# Patient Record
Sex: MALE | Race: WHITE | Employment: OTHER | URBAN - METROPOLITAN AREA
[De-identification: names, ages, dates, MRNs, and addresses within clinical notes are randomized per-mention and may not be internally consistent; named-entity substitution may affect disease eponyms.]

---

## 2017-02-18 ENCOUNTER — LAB ENCOUNTER (OUTPATIENT)
Dept: LAB | Facility: HOSPITAL | Age: 42
End: 2017-02-18
Attending: SURGERY
Payer: MEDICAID

## 2017-02-18 DIAGNOSIS — R35.1 NOCTURIA: ICD-10-CM

## 2017-02-18 DIAGNOSIS — Z13.220 ENCOUNTER FOR SCREENING FOR LIPID DISORDER: ICD-10-CM

## 2017-02-18 DIAGNOSIS — Z00.00 LABORATORY EXAMINATION ORDERED AS PART OF A COMPLETE PHYSICAL EXAMINATION: ICD-10-CM

## 2017-02-18 DIAGNOSIS — K82.8 GALLBLADDER SLUDGE: ICD-10-CM

## 2017-02-18 DIAGNOSIS — Z13.29 ENCOUNTER FOR SCREENING FOR ENDOCRINE DISORDER: ICD-10-CM

## 2017-02-18 DIAGNOSIS — K80.10 CHRONIC CHOLECYSTITIS WITH CALCULUS: ICD-10-CM

## 2017-02-18 DIAGNOSIS — K21.9 GASTROESOPHAGEAL REFLUX DISEASE, ESOPHAGITIS PRESENCE NOT SPECIFIED: ICD-10-CM

## 2017-02-18 DIAGNOSIS — Z13.228 ENCOUNTER FOR SCREENING FOR METABOLIC DISORDER: ICD-10-CM

## 2017-02-18 DIAGNOSIS — Z13.0 SCREENING FOR DISORDER OF BLOOD AND BLOOD-FORMING ORGANS: ICD-10-CM

## 2017-02-18 LAB
ALBUMIN SERPL BCP-MCNC: 4.2 G/DL (ref 3.5–4.8)
ALBUMIN/GLOB SERPL: 1.3 {RATIO} (ref 1–2)
ALP SERPL-CCNC: 46 U/L (ref 32–100)
ALT SERPL-CCNC: 47 U/L (ref 17–63)
ANION GAP SERPL CALC-SCNC: 6 MMOL/L (ref 0–18)
APTT PPP: 27.4 SECONDS (ref 23.2–35.3)
AST SERPL-CCNC: 30 U/L (ref 15–41)
BACTERIA UR QL AUTO: NEGATIVE /HPF
BASOPHILS # BLD: 0 K/UL (ref 0–0.2)
BASOPHILS NFR BLD: 0 %
BILIRUB SERPL-MCNC: 0.9 MG/DL (ref 0.3–1.2)
BILIRUB UR QL: NEGATIVE
BUN SERPL-MCNC: 13 MG/DL (ref 8–20)
BUN/CREAT SERPL: 12.9 (ref 10–20)
CALCIUM SERPL-MCNC: 9.4 MG/DL (ref 8.5–10.5)
CHLORIDE SERPL-SCNC: 106 MMOL/L (ref 95–110)
CHOLEST SERPL-MCNC: 209 MG/DL (ref 110–200)
CLARITY UR: CLEAR
CO2 SERPL-SCNC: 28 MMOL/L (ref 22–32)
COLOR UR: YELLOW
CREAT SERPL-MCNC: 1.01 MG/DL (ref 0.5–1.5)
EOSINOPHIL # BLD: 0.2 K/UL (ref 0–0.7)
EOSINOPHIL NFR BLD: 2 %
ERYTHROCYTE [DISTWIDTH] IN BLOOD BY AUTOMATED COUNT: 14.4 % (ref 11–15)
GLOBULIN PLAS-MCNC: 3.3 G/DL (ref 2.5–3.7)
GLUCOSE SERPL-MCNC: 94 MG/DL (ref 70–99)
GLUCOSE UR-MCNC: NEGATIVE MG/DL
HCT VFR BLD AUTO: 46.1 % (ref 41–52)
HDLC SERPL-MCNC: 35 MG/DL
HGB BLD-MCNC: 15.2 G/DL (ref 13.5–17.5)
INR BLD: 1.1 (ref 0.9–1.2)
KETONES UR-MCNC: NEGATIVE MG/DL
LDLC SERPL CALC-MCNC: 148 MG/DL (ref 0–99)
LEUKOCYTE ESTERASE UR QL STRIP.AUTO: NEGATIVE
LYMPHOCYTES # BLD: 1.4 K/UL (ref 1–4)
LYMPHOCYTES NFR BLD: 15 %
MCH RBC QN AUTO: 30.6 PG (ref 27–32)
MCHC RBC AUTO-ENTMCNC: 33 G/DL (ref 32–37)
MCV RBC AUTO: 92.8 FL (ref 80–100)
MONOCYTES # BLD: 0.7 K/UL (ref 0–1)
MONOCYTES NFR BLD: 7 %
NEUTROPHILS # BLD AUTO: 7.2 K/UL (ref 1.8–7.7)
NEUTROPHILS NFR BLD: 75 %
NITRITE UR QL STRIP.AUTO: NEGATIVE
NONHDLC SERPL-MCNC: 174 MG/DL
OSMOLALITY UR CALC.SUM OF ELEC: 290 MOSM/KG (ref 275–295)
PH UR: 5 [PH] (ref 5–8)
PLATELET # BLD AUTO: 237 K/UL (ref 140–400)
PMV BLD AUTO: 8.5 FL (ref 7.4–10.3)
POTASSIUM SERPL-SCNC: 4.5 MMOL/L (ref 3.3–5.1)
PROT SERPL-MCNC: 7.5 G/DL (ref 5.9–8.4)
PROT UR-MCNC: NEGATIVE MG/DL
PROTHROMBIN TIME: 13.8 SECONDS (ref 11.8–14.5)
PSA SERPL-MCNC: 0.3 NG/ML (ref 0–4)
RBC # BLD AUTO: 4.97 M/UL (ref 4.5–5.9)
RBC #/AREA URNS AUTO: 3 /HPF
SODIUM SERPL-SCNC: 140 MMOL/L (ref 136–144)
SP GR UR STRIP: 1.02 (ref 1–1.03)
TRIGL SERPL-MCNC: 128 MG/DL (ref 1–149)
TSH SERPL-ACNC: 0.89 UIU/ML (ref 0.34–5.6)
UROBILINOGEN UR STRIP-ACNC: <2
VIT C UR-MCNC: NEGATIVE MG/DL
WBC # BLD AUTO: 9.6 K/UL (ref 4–11)
WBC #/AREA URNS AUTO: <1 /HPF

## 2017-02-18 PROCEDURE — 84153 ASSAY OF PSA TOTAL: CPT

## 2017-02-18 PROCEDURE — 80061 LIPID PANEL: CPT

## 2017-02-18 PROCEDURE — 84443 ASSAY THYROID STIM HORMONE: CPT

## 2017-02-18 PROCEDURE — 80053 COMPREHEN METABOLIC PANEL: CPT

## 2017-02-18 PROCEDURE — 36415 COLL VENOUS BLD VENIPUNCTURE: CPT

## 2017-02-18 PROCEDURE — 81001 URINALYSIS AUTO W/SCOPE: CPT

## 2017-02-18 PROCEDURE — 85610 PROTHROMBIN TIME: CPT

## 2017-02-18 PROCEDURE — 85730 THROMBOPLASTIN TIME PARTIAL: CPT

## 2017-02-18 PROCEDURE — 85025 COMPLETE CBC W/AUTO DIFF WBC: CPT

## 2017-02-22 ENCOUNTER — SURGERY (OUTPATIENT)
Age: 42
End: 2017-02-22

## 2017-02-22 ENCOUNTER — HOSPITAL ENCOUNTER (OUTPATIENT)
Facility: HOSPITAL | Age: 42
Setting detail: HOSPITAL OUTPATIENT SURGERY
Discharge: HOME | End: 2017-02-22
Attending: SURGERY | Admitting: SURGERY
Payer: MEDICAID

## 2017-02-22 ENCOUNTER — ANESTHESIA (OUTPATIENT)
Dept: SURGERY | Facility: HOSPITAL | Age: 42
End: 2017-02-22
Payer: MEDICAID

## 2017-02-22 ENCOUNTER — APPOINTMENT (OUTPATIENT)
Dept: GENERAL RADIOLOGY | Facility: HOSPITAL | Age: 42
End: 2017-02-22
Attending: SURGERY
Payer: MEDICAID

## 2017-02-22 ENCOUNTER — ANESTHESIA EVENT (OUTPATIENT)
Dept: SURGERY | Facility: HOSPITAL | Age: 42
End: 2017-02-22
Payer: MEDICAID

## 2017-02-22 VITALS
BODY MASS INDEX: 26.06 KG/M2 | HEART RATE: 75 BPM | HEIGHT: 67 IN | DIASTOLIC BLOOD PRESSURE: 79 MMHG | RESPIRATION RATE: 16 BRPM | OXYGEN SATURATION: 100 % | SYSTOLIC BLOOD PRESSURE: 133 MMHG | TEMPERATURE: 98 F | WEIGHT: 166 LBS

## 2017-02-22 DIAGNOSIS — K80.10 CHRONIC CHOLECYSTITIS WITH CALCULUS: Primary | ICD-10-CM

## 2017-02-22 PROCEDURE — 0FT44ZZ RESECTION OF GALLBLADDER, PERCUTANEOUS ENDOSCOPIC APPROACH: ICD-10-PCS | Performed by: SURGERY

## 2017-02-22 PROCEDURE — 74300 X-RAY BILE DUCTS/PANCREAS: CPT

## 2017-02-22 PROCEDURE — BF101ZZ FLUOROSCOPY OF BILE DUCTS USING LOW OSMOLAR CONTRAST: ICD-10-PCS | Performed by: SURGERY

## 2017-02-22 PROCEDURE — 88304 TISSUE EXAM BY PATHOLOGIST: CPT | Performed by: SURGERY

## 2017-02-22 PROCEDURE — 8E0W4CZ ROBOTIC ASSISTED PROCEDURE OF TRUNK REGION, PERCUTANEOUS ENDOSCOPIC APPROACH: ICD-10-PCS | Performed by: SURGERY

## 2017-02-22 RX ORDER — HYDROMORPHONE HYDROCHLORIDE 1 MG/ML
0.4 INJECTION, SOLUTION INTRAMUSCULAR; INTRAVENOUS; SUBCUTANEOUS EVERY 5 MIN PRN
Status: DISCONTINUED | OUTPATIENT
Start: 2017-02-22 | End: 2017-02-22

## 2017-02-22 RX ORDER — HALOPERIDOL 5 MG/ML
0.25 INJECTION INTRAMUSCULAR ONCE AS NEEDED
Status: DISCONTINUED | OUTPATIENT
Start: 2017-02-22 | End: 2017-02-22

## 2017-02-22 RX ORDER — FAMOTIDINE 20 MG/1
20 TABLET ORAL ONCE
Status: DISCONTINUED | OUTPATIENT
Start: 2017-02-22 | End: 2017-02-22 | Stop reason: HOSPADM

## 2017-02-22 RX ORDER — SODIUM CHLORIDE, SODIUM LACTATE, POTASSIUM CHLORIDE, CALCIUM CHLORIDE 600; 310; 30; 20 MG/100ML; MG/100ML; MG/100ML; MG/100ML
INJECTION, SOLUTION INTRAVENOUS CONTINUOUS
Status: DISCONTINUED | OUTPATIENT
Start: 2017-02-22 | End: 2017-02-22

## 2017-02-22 RX ORDER — ONDANSETRON 2 MG/ML
4 INJECTION INTRAMUSCULAR; INTRAVENOUS ONCE AS NEEDED
Status: COMPLETED | OUTPATIENT
Start: 2017-02-22 | End: 2017-02-22

## 2017-02-22 RX ORDER — NEOSTIGMINE METHYLSULFATE 0.5 MG/ML
INJECTION INTRAVENOUS AS NEEDED
Status: DISCONTINUED | OUTPATIENT
Start: 2017-02-22 | End: 2017-02-22 | Stop reason: SURG

## 2017-02-22 RX ORDER — DEXAMETHASONE SODIUM PHOSPHATE 4 MG/ML
VIAL (ML) INJECTION AS NEEDED
Status: DISCONTINUED | OUTPATIENT
Start: 2017-02-22 | End: 2017-02-22 | Stop reason: SURG

## 2017-02-22 RX ORDER — MORPHINE SULFATE 4 MG/ML
4 INJECTION, SOLUTION INTRAMUSCULAR; INTRAVENOUS EVERY 10 MIN PRN
Status: DISCONTINUED | OUTPATIENT
Start: 2017-02-22 | End: 2017-02-22

## 2017-02-22 RX ORDER — BUPIVACAINE HYDROCHLORIDE AND EPINEPHRINE 2.5; 5 MG/ML; UG/ML
INJECTION, SOLUTION INFILTRATION; PERINEURAL AS NEEDED
Status: DISCONTINUED | OUTPATIENT
Start: 2017-02-22 | End: 2017-02-22 | Stop reason: HOSPADM

## 2017-02-22 RX ORDER — HYDROCODONE BITARTRATE AND ACETAMINOPHEN 5; 325 MG/1; MG/1
1 TABLET ORAL AS NEEDED
Status: COMPLETED | OUTPATIENT
Start: 2017-02-22 | End: 2017-02-22

## 2017-02-22 RX ORDER — HYDROMORPHONE HYDROCHLORIDE 1 MG/ML
0.6 INJECTION, SOLUTION INTRAMUSCULAR; INTRAVENOUS; SUBCUTANEOUS EVERY 5 MIN PRN
Status: DISCONTINUED | OUTPATIENT
Start: 2017-02-22 | End: 2017-02-22

## 2017-02-22 RX ORDER — METOCLOPRAMIDE 10 MG/1
10 TABLET ORAL ONCE
Status: DISCONTINUED | OUTPATIENT
Start: 2017-02-22 | End: 2017-02-22 | Stop reason: HOSPADM

## 2017-02-22 RX ORDER — MIDAZOLAM HYDROCHLORIDE 1 MG/ML
INJECTION INTRAMUSCULAR; INTRAVENOUS AS NEEDED
Status: DISCONTINUED | OUTPATIENT
Start: 2017-02-22 | End: 2017-02-22 | Stop reason: SURG

## 2017-02-22 RX ORDER — MORPHINE SULFATE 2 MG/ML
2 INJECTION, SOLUTION INTRAMUSCULAR; INTRAVENOUS EVERY 10 MIN PRN
Status: DISCONTINUED | OUTPATIENT
Start: 2017-02-22 | End: 2017-02-22

## 2017-02-22 RX ORDER — HYDROMORPHONE HYDROCHLORIDE 1 MG/ML
0.2 INJECTION, SOLUTION INTRAMUSCULAR; INTRAVENOUS; SUBCUTANEOUS EVERY 5 MIN PRN
Status: DISCONTINUED | OUTPATIENT
Start: 2017-02-22 | End: 2017-02-22

## 2017-02-22 RX ORDER — MORPHINE SULFATE 10 MG/ML
6 INJECTION, SOLUTION INTRAMUSCULAR; INTRAVENOUS EVERY 10 MIN PRN
Status: DISCONTINUED | OUTPATIENT
Start: 2017-02-22 | End: 2017-02-22

## 2017-02-22 RX ORDER — ONDANSETRON 2 MG/ML
INJECTION INTRAMUSCULAR; INTRAVENOUS AS NEEDED
Status: DISCONTINUED | OUTPATIENT
Start: 2017-02-22 | End: 2017-02-22 | Stop reason: SURG

## 2017-02-22 RX ORDER — ACETAMINOPHEN 325 MG/1
650 TABLET ORAL ONCE
Status: COMPLETED | OUTPATIENT
Start: 2017-02-22 | End: 2017-02-22

## 2017-02-22 RX ORDER — NALOXONE HYDROCHLORIDE 0.4 MG/ML
80 INJECTION, SOLUTION INTRAMUSCULAR; INTRAVENOUS; SUBCUTANEOUS AS NEEDED
Status: DISCONTINUED | OUTPATIENT
Start: 2017-02-22 | End: 2017-02-22

## 2017-02-22 RX ORDER — HYDROCODONE BITARTRATE AND ACETAMINOPHEN 5; 325 MG/1; MG/1
2 TABLET ORAL AS NEEDED
Status: COMPLETED | OUTPATIENT
Start: 2017-02-22 | End: 2017-02-22

## 2017-02-22 RX ORDER — ROCURONIUM BROMIDE 10 MG/ML
INJECTION, SOLUTION INTRAVENOUS AS NEEDED
Status: DISCONTINUED | OUTPATIENT
Start: 2017-02-22 | End: 2017-02-22 | Stop reason: SURG

## 2017-02-22 RX ORDER — GLYCOPYRROLATE 0.2 MG/ML
INJECTION INTRAMUSCULAR; INTRAVENOUS AS NEEDED
Status: DISCONTINUED | OUTPATIENT
Start: 2017-02-22 | End: 2017-02-22 | Stop reason: SURG

## 2017-02-22 RX ORDER — HYDROCODONE BITARTRATE AND ACETAMINOPHEN 5; 325 MG/1; MG/1
1 TABLET ORAL EVERY 6 HOURS PRN
Qty: 20 TABLET | Refills: 0 | Status: SHIPPED | OUTPATIENT
Start: 2017-02-22 | End: 2017-03-07 | Stop reason: ALTCHOICE

## 2017-02-22 RX ORDER — LIDOCAINE HYDROCHLORIDE 10 MG/ML
INJECTION, SOLUTION EPIDURAL; INFILTRATION; INTRACAUDAL; PERINEURAL AS NEEDED
Status: DISCONTINUED | OUTPATIENT
Start: 2017-02-22 | End: 2017-02-22 | Stop reason: SURG

## 2017-02-22 RX ADMIN — LIDOCAINE HYDROCHLORIDE 40 MG: 10 INJECTION, SOLUTION EPIDURAL; INFILTRATION; INTRACAUDAL; PERINEURAL at 07:42:00

## 2017-02-22 RX ADMIN — GLYCOPYRROLATE 0.5 MG: 0.2 INJECTION INTRAMUSCULAR; INTRAVENOUS at 09:47:00

## 2017-02-22 RX ADMIN — SODIUM CHLORIDE, SODIUM LACTATE, POTASSIUM CHLORIDE, CALCIUM CHLORIDE: 600; 310; 30; 20 INJECTION, SOLUTION INTRAVENOUS at 07:38:00

## 2017-02-22 RX ADMIN — GLYCOPYRROLATE 0.1 MG: 0.2 INJECTION INTRAMUSCULAR; INTRAVENOUS at 07:42:00

## 2017-02-22 RX ADMIN — SODIUM CHLORIDE, SODIUM LACTATE, POTASSIUM CHLORIDE, CALCIUM CHLORIDE: 600; 310; 30; 20 INJECTION, SOLUTION INTRAVENOUS at 09:31:00

## 2017-02-22 RX ADMIN — NEOSTIGMINE METHYLSULFATE 3.5 MG: 0.5 INJECTION INTRAVENOUS at 09:47:00

## 2017-02-22 RX ADMIN — SODIUM CHLORIDE, SODIUM LACTATE, POTASSIUM CHLORIDE, CALCIUM CHLORIDE: 600; 310; 30; 20 INJECTION, SOLUTION INTRAVENOUS at 09:50:00

## 2017-02-22 RX ADMIN — DEXAMETHASONE SODIUM PHOSPHATE 4 MG: 4 MG/ML VIAL (ML) INJECTION at 07:42:00

## 2017-02-22 RX ADMIN — ROCURONIUM BROMIDE 40 MG: 10 INJECTION, SOLUTION INTRAVENOUS at 07:42:00

## 2017-02-22 RX ADMIN — ONDANSETRON 4 MG: 2 INJECTION INTRAMUSCULAR; INTRAVENOUS at 09:31:00

## 2017-02-22 RX ADMIN — MIDAZOLAM HYDROCHLORIDE 2 MG: 1 INJECTION INTRAMUSCULAR; INTRAVENOUS at 07:38:00

## 2017-02-22 NOTE — OPERATIVE REPORT
OPERATIVE REPORT:     PATIENT NAME: Zbigniew Coates  : 1975    CSN: [id number]     DATE OF OPERATION:   2017    PREOPERATIVE DIAGNOSIS: Chronic Cholecystitis, Cholelithiasis    POSTOPERATIVE DIAGNOSIS: Chronic Cholecystitis, Cholelithiasis     P port site. The abdomen was copiously irrigated and careful hemostasis was ensured throughout. The ports were removed and the abdomen was desufflated.  The umbilical fascial incision was closed using ) PDS continuous suture and interrupted 0 Vicryl suture

## 2017-02-22 NOTE — DISCHARGE SUMMARY
Outpatient Surgery Brief Discharge Summary         Patient ID:  Radha Geronimo  [id number]  39year old  9/2/1975    Discharge Diagnoses: chronic cholecystitis with calculus, gallbladder sludge, umbilical hernia    Procedures: Robotic single site cholecyste

## 2017-02-22 NOTE — ANESTHESIA PREPROCEDURE EVALUATION
Anesthesia PreOp Note    HPI:     Cyn Calero is a 39year old male who presents for preoperative consultation requested by: Jeff Olvera MD    Date of Surgery: 2/22/2017    Procedure(s):  ROBOT-ASSISTED LAPAROSCOPIC SINGLE SITE CHOLECYSTECTOMY  In 10 mg 10 mg Oral Once Rhea Taylor MD    CeFAZolin Sodium (ANCEF/KEFZOL) IVPB premix 2 g 2 g Intravenous Once Cate Harris MD      No current [facility]-ordered outpatient prescriptions on file.       Seafood                 Anaphylaxis, Swelling, Sofia respiration is 18 and oxygen saturation is 100%.     02/16/17  1712 02/22/17  0702   BP:  109/63   Pulse:  66   Temp:  98.2 °F (36.8 °C)   TempSrc:  Oral   Resp:  18   Height: 1.702 m (5' 7\") 1.702 m (5' 7\")   Weight: 75.297 kg (166 lb) 75.297 kg (166 lb)

## 2017-02-22 NOTE — BRIEF OP NOTE
[facility] POST ANESTHESIA CARE UNIT  Brief Op Note       Patients Name: Ezra Wright  Attending Physician: Carrington Lombard, MD  Operating Physician: Heide Dozier MD  CSN: [id number]     Location:  OR  MRN: [id number]    YOB: 1975

## 2017-02-22 NOTE — H&P
Karon Hall is a 39year old male.    Patient presents with:  Consult: consult upper right quad pain/no testing. Pt states sharp pain in the upper left qudarant, nausea, no vomitting.     PCP: Venita Bhatti DO      HPI:       The patient presents for e Jaclyn Sanchez     •  Heart Disorder  Paternal Grandfather      •  Cancer  Paternal Grandmother          colon    •  Cancer  Maternal Grandmother          colon          Smoking Status: Never Smoker                       Smokeless Status: Never Nilson Falcon hydronephrosis. Right kidney measures 10.4 cm. Left kidney measures 10.8 cm. AORTA/IVC: Visualized portions unremarkable  =====  CONCLUSION:    1. Sludge in the gallbladder. No ultrasound evidence of acute cholecystitis.   2. Mildly limited evaluation of t

## 2017-02-22 NOTE — ANESTHESIA POSTPROCEDURE EVALUATION
Patient: Simi Cerna    Procedure Summary     Date Anesthesia Start Anesthesia Stop Room / Location    02/22/17 0738 1009 [facility] MAIN OR 07 / 300 [facility] MAIN OR       Procedure Diagnosis Surgeon Responsible Provider    ROBOT-ASSISTED LAPAROSCOPIC SINGLE SITE FRANK

## 2017-02-23 NOTE — PROGRESS NOTES
Quick Note:    Some blood noted in the urine. I will order a CT scan of the abdomen and pelvis to look for a cause. You should schedule to have this done at [facility]. Your cholesterol is mildly elevated.  Work on a low fat diet and exercise to improv

## 2017-04-05 ENCOUNTER — HOSPITAL ENCOUNTER (OUTPATIENT)
Dept: CT IMAGING | Age: 42
Discharge: HOME | End: 2017-04-05
Attending: INTERNAL MEDICINE
Payer: MEDICAID

## 2017-04-05 DIAGNOSIS — R31.29 MICROSCOPIC HEMATURIA: ICD-10-CM

## 2017-04-05 PROCEDURE — 74178 CT ABD&PLV WO CNTR FLWD CNTR: CPT

## 2021-03-26 PROBLEM — R07.89 OTHER CHEST PAIN: Status: ACTIVE | Noted: 2021-03-26

## 2021-03-27 ENCOUNTER — LAB ENCOUNTER (OUTPATIENT)
Dept: LAB | Age: 46
End: 2021-03-27
Attending: FAMILY MEDICINE
Payer: MEDICAID

## 2021-03-27 DIAGNOSIS — E78.00 HYPERCHOLESTEROLEMIA: ICD-10-CM

## 2021-03-27 DIAGNOSIS — R10.13 ABDOMINAL PAIN, EPIGASTRIC: Primary | ICD-10-CM

## 2021-03-27 DIAGNOSIS — R07.9 CHEST PAIN, UNSPECIFIED TYPE: ICD-10-CM

## 2021-03-27 LAB
CHOLEST SMN-MCNC: 216 MG/DL (ref ?–200)
EST. AVERAGE GLUCOSE BLD GHB EST-MCNC: 128 MG/DL (ref 68–126)
HBA1C MFR BLD HPLC: 6.1 % (ref ?–5.7)
HDLC SERPL-MCNC: 41 MG/DL (ref 40–59)
IGA SERPL-MCNC: 383 MG/DL (ref 70–312)
LDLC SERPL CALC-MCNC: 145 MG/DL (ref ?–100)
NONHDLC SERPL-MCNC: 175 MG/DL (ref ?–130)
TRIGL SERPL-MCNC: 148 MG/DL (ref 30–149)
VLDLC SERPL CALC-MCNC: 30 MG/DL (ref 0–30)

## 2021-03-27 PROCEDURE — 86256 FLUORESCENT ANTIBODY TITER: CPT

## 2021-03-27 PROCEDURE — 83516 IMMUNOASSAY NONANTIBODY: CPT

## 2021-03-27 PROCEDURE — 82784 ASSAY IGA/IGD/IGG/IGM EACH: CPT

## 2021-03-27 PROCEDURE — 36415 COLL VENOUS BLD VENIPUNCTURE: CPT

## 2021-03-27 PROCEDURE — 83036 HEMOGLOBIN GLYCOSYLATED A1C: CPT

## 2021-03-27 PROCEDURE — 80061 LIPID PANEL: CPT

## 2021-03-30 LAB
GLIADIN IGA SER-ACNC: 55 U/ML (ref ?–7)
GLIADIN IGG SER-ACNC: 67 U/ML (ref ?–7)
TTG IGA SER-ACNC: 9.1 U/ML (ref ?–7)

## 2021-03-30 NOTE — PROGRESS NOTES
Noted.    Future Appointments  4/8/2021   1:00 PM    SP NUCLEAR                 EVA GUILLEN CONRADO  4/8/2021   3:30 PM    SP ECHO                    EVA GUILLEN CONRADO  4/9/2021   12:30 PM   Tereza Campoverde MD        [facility]

## 2021-04-09 ENCOUNTER — APPOINTMENT (OUTPATIENT)
Dept: GENERAL RADIOLOGY | Facility: HOSPITAL | Age: 46
End: 2021-04-09
Attending: EMERGENCY MEDICINE
Payer: MEDICAID

## 2021-04-09 ENCOUNTER — HOSPITAL ENCOUNTER (EMERGENCY)
Facility: HOSPITAL | Age: 46
Discharge: HOME | End: 2021-04-09
Attending: EMERGENCY MEDICINE
Payer: MEDICAID

## 2021-04-09 ENCOUNTER — APPOINTMENT (OUTPATIENT)
Dept: CT IMAGING | Facility: HOSPITAL | Age: 46
End: 2021-04-09
Attending: EMERGENCY MEDICINE
Payer: MEDICAID

## 2021-04-09 VITALS
HEIGHT: 67 IN | RESPIRATION RATE: 16 BRPM | WEIGHT: 180 LBS | DIASTOLIC BLOOD PRESSURE: 83 MMHG | BODY MASS INDEX: 28.25 KG/M2 | TEMPERATURE: 99 F | HEART RATE: 85 BPM | OXYGEN SATURATION: 100 % | SYSTOLIC BLOOD PRESSURE: 136 MMHG

## 2021-04-09 DIAGNOSIS — D64.9 ANEMIA, UNSPECIFIED TYPE: ICD-10-CM

## 2021-04-09 DIAGNOSIS — R07.9 CHEST PAIN OF UNCERTAIN ETIOLOGY: ICD-10-CM

## 2021-04-09 DIAGNOSIS — R10.13 EPIGASTRIC PAIN: Primary | ICD-10-CM

## 2021-04-09 PROBLEM — K90.0 CELIAC DISEASE (HCC): Status: ACTIVE | Noted: 2021-04-09

## 2021-04-09 PROBLEM — M94.0 COSTOCHONDRITIS: Status: ACTIVE | Noted: 2021-04-09

## 2021-04-09 PROBLEM — R42 DIZZINESS: Status: ACTIVE | Noted: 2021-04-09

## 2021-04-09 PROBLEM — K90.0 CELIAC DISEASE: Status: ACTIVE | Noted: 2021-04-09

## 2021-04-09 PROCEDURE — 80053 COMPREHEN METABOLIC PANEL: CPT | Performed by: EMERGENCY MEDICINE

## 2021-04-09 PROCEDURE — 99285 EMERGENCY DEPT VISIT HI MDM: CPT

## 2021-04-09 PROCEDURE — 71045 X-RAY EXAM CHEST 1 VIEW: CPT | Performed by: EMERGENCY MEDICINE

## 2021-04-09 PROCEDURE — 84484 ASSAY OF TROPONIN QUANT: CPT | Performed by: EMERGENCY MEDICINE

## 2021-04-09 PROCEDURE — 74177 CT ABD & PELVIS W/CONTRAST: CPT | Performed by: EMERGENCY MEDICINE

## 2021-04-09 PROCEDURE — 82272 OCCULT BLD FECES 1-3 TESTS: CPT

## 2021-04-09 PROCEDURE — 83690 ASSAY OF LIPASE: CPT | Performed by: EMERGENCY MEDICINE

## 2021-04-09 PROCEDURE — 93010 ELECTROCARDIOGRAM REPORT: CPT

## 2021-04-09 PROCEDURE — 85379 FIBRIN DEGRADATION QUANT: CPT | Performed by: EMERGENCY MEDICINE

## 2021-04-09 PROCEDURE — 85025 COMPLETE CBC W/AUTO DIFF WBC: CPT | Performed by: EMERGENCY MEDICINE

## 2021-04-09 PROCEDURE — 96360 HYDRATION IV INFUSION INIT: CPT

## 2021-04-09 PROCEDURE — 96361 HYDRATE IV INFUSION ADD-ON: CPT

## 2021-04-09 PROCEDURE — 93005 ELECTROCARDIOGRAM TRACING: CPT

## 2021-04-09 RX ORDER — MAGNESIUM HYDROXIDE/ALUMINUM HYDROXICE/SIMETHICONE 120; 1200; 1200 MG/30ML; MG/30ML; MG/30ML
30 SUSPENSION ORAL ONCE
Status: COMPLETED | OUTPATIENT
Start: 2021-04-09 | End: 2021-04-09

## 2021-04-09 RX ORDER — SODIUM CHLORIDE 9 MG/ML
INJECTION, SOLUTION INTRAVENOUS CONTINUOUS
Status: DISCONTINUED | OUTPATIENT
Start: 2021-04-09 | End: 2021-04-10

## 2021-04-09 RX ORDER — ASPIRIN 81 MG/1
324 TABLET, CHEWABLE ORAL ONCE
Status: COMPLETED | OUTPATIENT
Start: 2021-04-09 | End: 2021-04-09

## 2021-04-09 NOTE — ED INITIAL ASSESSMENT (HPI)
Arrives stating has had chest pain for a couple of weeks. Underwent a stress test yesterday and had a syncopal episode during the test.  Was contacted by the doctor today and instructed to come to the hospital.  Describes as being across the rib cage.
no

## 2021-04-10 NOTE — ED PROVIDER NOTES
Patient Seen in: [facility] Emergency Department      History   Patient presents with:  Chest Pain Angina    Stated Complaint: chest pain    HPI/Subjective:   HPI    Patient is a 66-year-old male who states for the past month he has had chest pain. 04/09/21 1848 98 %   O2 Device 04/09/21 1847 None (Room air)       Current:/83   Pulse 85   Temp 98.7 °F (37.1 °C) (Temporal)   Resp 16   Ht 170.2 cm (5' 7\")   Wt 81.6 kg   SpO2 100%   BMI 28.19 kg/m²         Physical Exam  GENERAL: Patient resting LAVENDER   RAINBOW DRAW LIGHT GREEN   RAINBOW DRAW GOLD     EKG    Rate, intervals and axes as noted on EKG Report.   Rate: 100  Rhythm: Sinus Rhythm  Reading: Normal sinus rhythm, no acute changes              Chest x-ray unremarkable    CT abdomen pelvis

## 2021-10-22 ENCOUNTER — LAB ENCOUNTER (OUTPATIENT)
Dept: LAB | Age: 46
End: 2021-10-22
Attending: INTERNAL MEDICINE
Payer: MEDICAID

## 2021-10-22 DIAGNOSIS — D50.9 IRON DEFICIENCY ANEMIA, UNSPECIFIED IRON DEFICIENCY ANEMIA TYPE: ICD-10-CM

## 2021-10-22 PROCEDURE — 83550 IRON BINDING TEST: CPT

## 2021-10-22 PROCEDURE — 36415 COLL VENOUS BLD VENIPUNCTURE: CPT

## 2021-10-22 PROCEDURE — 83540 ASSAY OF IRON: CPT

## 2021-10-22 PROCEDURE — 82728 ASSAY OF FERRITIN: CPT

## 2021-10-22 PROCEDURE — 85025 COMPLETE CBC W/AUTO DIFF WBC: CPT

## 2022-04-27 ENCOUNTER — LAB ENCOUNTER (OUTPATIENT)
Dept: LAB | Age: 47
End: 2022-04-27
Attending: NURSE PRACTITIONER
Payer: MEDICAID

## 2022-04-27 DIAGNOSIS — D50.9 IRON DEFICIENCY ANEMIA, UNSPECIFIED IRON DEFICIENCY ANEMIA TYPE: ICD-10-CM

## 2022-04-27 LAB
BASOPHILS # BLD AUTO: 0.05 X10(3) UL (ref 0–0.2)
BASOPHILS NFR BLD AUTO: 0.6 %
DEPRECATED HBV CORE AB SER IA-ACNC: 29.4 NG/ML
EOSINOPHIL # BLD AUTO: 0.31 X10(3) UL (ref 0–0.7)
EOSINOPHIL NFR BLD AUTO: 3.6 %
ERYTHROCYTE [DISTWIDTH] IN BLOOD BY AUTOMATED COUNT: 13 %
HCT VFR BLD AUTO: 50.2 %
HGB BLD-MCNC: 16.4 G/DL
IMM GRANULOCYTES # BLD AUTO: 0.02 X10(3) UL (ref 0–1)
IMM GRANULOCYTES NFR BLD: 0.2 %
IRON SATN MFR SERPL: 25 %
IRON SERPL-MCNC: 116 UG/DL
LYMPHOCYTES # BLD AUTO: 2.32 X10(3) UL (ref 1–4)
LYMPHOCYTES NFR BLD AUTO: 27 %
MCH RBC QN AUTO: 31.4 PG (ref 26–34)
MCHC RBC AUTO-ENTMCNC: 32.7 G/DL (ref 31–37)
MCV RBC AUTO: 96 FL
MONOCYTES # BLD AUTO: 0.7 X10(3) UL (ref 0.1–1)
MONOCYTES NFR BLD AUTO: 8.1 %
NEUTROPHILS # BLD AUTO: 5.19 X10 (3) UL (ref 1.5–7.7)
NEUTROPHILS # BLD AUTO: 5.19 X10(3) UL (ref 1.5–7.7)
NEUTROPHILS NFR BLD AUTO: 60.5 %
PLATELET # BLD AUTO: 250 10(3)UL (ref 150–450)
RBC # BLD AUTO: 5.23 X10(6)UL
TIBC SERPL-MCNC: 463 UG/DL (ref 240–450)
TRANSFERRIN SERPL-MCNC: 311 MG/DL (ref 200–360)
WBC # BLD AUTO: 8.6 X10(3) UL (ref 4–11)

## 2022-04-27 PROCEDURE — 85025 COMPLETE CBC W/AUTO DIFF WBC: CPT

## 2022-04-27 PROCEDURE — 83540 ASSAY OF IRON: CPT

## 2022-04-27 PROCEDURE — 36415 COLL VENOUS BLD VENIPUNCTURE: CPT

## 2022-04-27 PROCEDURE — 82728 ASSAY OF FERRITIN: CPT

## 2022-04-27 PROCEDURE — 83550 IRON BINDING TEST: CPT

## 2023-08-31 ENCOUNTER — OFFICE VISIT (OUTPATIENT)
Dept: SLEEP CENTER | Age: 48
End: 2023-08-31
Attending: INTERNAL MEDICINE
Payer: MEDICAID

## 2023-08-31 DIAGNOSIS — G47.10 HYPERSOMNIA: ICD-10-CM

## 2023-08-31 PROCEDURE — 95806 SLEEP STUDY UNATT&RESP EFFT: CPT

## 2024-05-06 ENCOUNTER — ANESTHESIA (OUTPATIENT)
Dept: ENDOSCOPY | Facility: HOSPITAL | Age: 49
End: 2024-05-06
Payer: MEDICAID

## 2024-05-06 ENCOUNTER — HOSPITAL ENCOUNTER (OUTPATIENT)
Facility: HOSPITAL | Age: 49
Setting detail: HOSPITAL OUTPATIENT SURGERY
Discharge: HOME | End: 2024-05-06
Attending: INTERNAL MEDICINE | Admitting: INTERNAL MEDICINE
Payer: MEDICAID

## 2024-05-06 ENCOUNTER — ANESTHESIA EVENT (OUTPATIENT)
Dept: ENDOSCOPY | Facility: HOSPITAL | Age: 49
End: 2024-05-06
Payer: MEDICAID

## 2024-05-06 VITALS
HEIGHT: 67 IN | DIASTOLIC BLOOD PRESSURE: 84 MMHG | HEART RATE: 60 BPM | BODY MASS INDEX: 27.62 KG/M2 | WEIGHT: 176 LBS | OXYGEN SATURATION: 100 % | TEMPERATURE: 98 F | RESPIRATION RATE: 18 BRPM | SYSTOLIC BLOOD PRESSURE: 119 MMHG

## 2024-05-06 PROCEDURE — 0DB38ZX EXCISION OF LOWER ESOPHAGUS, VIA NATURAL OR ARTIFICIAL OPENING ENDOSCOPIC, DIAGNOSTIC: ICD-10-PCS | Performed by: INTERNAL MEDICINE

## 2024-05-06 PROCEDURE — 0DB98ZX EXCISION OF DUODENUM, VIA NATURAL OR ARTIFICIAL OPENING ENDOSCOPIC, DIAGNOSTIC: ICD-10-PCS | Performed by: INTERNAL MEDICINE

## 2024-05-06 PROCEDURE — 88305 TISSUE EXAM BY PATHOLOGIST: CPT | Performed by: INTERNAL MEDICINE

## 2024-05-06 RX ORDER — SODIUM CHLORIDE, SODIUM LACTATE, POTASSIUM CHLORIDE, CALCIUM CHLORIDE 600; 310; 30; 20 MG/100ML; MG/100ML; MG/100ML; MG/100ML
INJECTION, SOLUTION INTRAVENOUS CONTINUOUS
Status: DISCONTINUED | OUTPATIENT
Start: 2024-05-06 | End: 2024-05-06

## 2024-05-06 RX ORDER — NALOXONE HYDROCHLORIDE 0.4 MG/ML
0.08 INJECTION, SOLUTION INTRAMUSCULAR; INTRAVENOUS; SUBCUTANEOUS ONCE AS NEEDED
Status: DISCONTINUED | OUTPATIENT
Start: 2024-05-06 | End: 2024-05-06

## 2024-05-06 RX ORDER — LIDOCAINE HYDROCHLORIDE 10 MG/ML
INJECTION, SOLUTION EPIDURAL; INFILTRATION; INTRACAUDAL; PERINEURAL AS NEEDED
Status: DISCONTINUED | OUTPATIENT
Start: 2024-05-06 | End: 2024-05-06 | Stop reason: SURG

## 2024-05-06 RX ADMIN — SODIUM CHLORIDE, SODIUM LACTATE, POTASSIUM CHLORIDE, CALCIUM CHLORIDE: 600; 310; 30; 20 INJECTION, SOLUTION INTRAVENOUS at 15:04:00

## 2024-05-06 RX ADMIN — SODIUM CHLORIDE, SODIUM LACTATE, POTASSIUM CHLORIDE, CALCIUM CHLORIDE: 600; 310; 30; 20 INJECTION, SOLUTION INTRAVENOUS at 15:20:00

## 2024-05-06 RX ADMIN — LIDOCAINE HYDROCHLORIDE 25 MG: 10 INJECTION, SOLUTION EPIDURAL; INFILTRATION; INTRACAUDAL; PERINEURAL at 15:14:00

## 2024-05-06 NOTE — ANESTHESIA POSTPROCEDURE EVALUATION
[facility]    Sameer Almodovar Patient Status:  Hospital Outpatient Surgery   Age/Gender 48 year old male MRN [id number]   Location [facility] ENDOSCOPY PAIN CENTER Attending Lavell Kat MD   Hosp Day # 0 PCP Flavio Chowdhury MD       Anesthesia Post-op Note    ESOPHAGOGASTRODUODENOSCOPY (EGD) WITH BIOPSIES    Procedure Summary       Date: 05/06/24 Room / Location:  ENDOSCOPY 03 / [facility] END[facility]COPY    Anesthesia Start: 1504 Anesthesia Stop: 1525    Procedure: ESOPHAGOGASTRODUODENOSCOPY (EGD) WITH BIOPSIES Diagnosis: (BARRETTS ESOPHAGUS, DUODENAL INFLAMMATION)    Surgeons: Lavell Kat MD Anesthesiologist: Benjie Juarez MD    Anesthesia Type: MAC ASA Status: 2            Anesthesia Type: MAC        Patient Location: Endoscopy    Anesthesia Type: MAC    Airway Patency: patent    Postop Pain Control: adequate    Mental Status: preanesthetic baseline    Nausea/Vomiting: none    Cardiopulmonary/Hydration status: stable euvolemic    Complications: no apparent anesthesia related complications    Postop vital signs: stable    Dental Exam: Unchanged from Preop    Patient to be discharged from PACU when criteria met.

## 2024-05-06 NOTE — OPERATIVE REPORT
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 The potentially life-threatening complications of sedation, bleeding,  Perforation, transfusion or repeat endoscopy were reviewed along with the possible need for hospitalization, surgical management, transfusion or repeat endoscopy should one of these complications arise. The patient understands and is agreeable to proceed.  Sedation Type: MAC-Patient received sedation with monitored anesthesia provided by an anesthesiologist      Procedure Description: The patient was placed in the left lateral decubitus position.  A bite block was placed in the patient’s mouth.  The endoscope was inserted through the mouth and advanced under direct visualization to the descending duodenum and was then withdrawn to examine the duodenal bulb and gastric antrum.  The endoscope was then retroflexed to examine the angulus, GE junction, cardia, body and fundus and then withdrawn to examine the esophagus. The endoscope was then removed from the patient. The patient tolerated the procedure well with no immediate complications and was transferred to the recovery area in stable  condition.  Findings:   ESOPHAGUS:  salmon colored mucosa from 35 to 31 cm proximal to incisors, biopsied at 35, 33, 31 cm   STOMACH:  3 cm hiatal hernia, otherwise normal   DUODENUM:  villous blunting, mild ulceration, biopsied  Recommendations: await pathology, continue pantoprazole  Discharge:  The patient was given an after visit summary detailing the procedure, findings, recommendations and follow up plans.  Lavell Kat MD  5/6/2024  3:22 PM

## 2024-05-06 NOTE — ANESTHESIA PREPROCEDURE EVALUATION
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  Temp: 98.3 °F (36.8 °C)  Pulse: 71  Resp: 20  BP: 130/74  SpO2: 100 %  Body mass index is 27.57 kg/m².    Current medications reviewed.  Hospital Medications:   lactated ringers infusion   Intravenous Continuous       Outpatient Medications:     Medications Prior to Admission   Medication Sig Dispense Refill Last Dose    IRON OR daily.   5/5/2024    Multiple Vitamins-Minerals (MULTI FOR HIM) Oral Tab Take by mouth.   5/5/2024    Pantoprazole Sodium 40 MG Oral Tab EC Take 1 tablet (40 mg total) by mouth 2 (two) times daily before meals. (Patient taking differently: Take 1 tablet (40 mg total) by mouth as needed.) 60 tablet 3 5/5/2024       Allergies: Seafood      Anesthesia Evaluation  Patient Active Problem List   Diagnosis    Routine general medical examination at a health care facility    Iron deficiency    Hypercholesterolemia    Low HDL (under 40)    Family history of stroke    Seafood allergy    Microscopic hematuria    Abdominal pain, epigastric    Gastroesophageal reflux disease    Chest pain, unspecified type    Costochondritis    Celiac disease (HCC)    Dizziness        Past Medical History:    Asthma (HCC)    Blood type A-    Celiac disease (HCC)    GERD (gastroesophageal reflux disease)    Visual impairment        Past Surgical History:   Procedure Laterality Date    Cholecystectomy  02/22/2017    Lasik Bilateral 01/01/1998    Upper gi endoscopy,exam       Social History     Socioeconomic History    Marital status:     Number of children: 4   Occupational History    Occupation:    Tobacco Use    Smoking status: Never    Smokeless tobacco: Never    Vaping Use    Vaping status: Never Used   Substance and Sexual Activity    Alcohol use: Not Currently     Alcohol/week: 0.0 - 1.0 standard drinks of alcohol     Comment: social-rare    Drug use: No    Sexual activity: Yes     Partners: Female   Other Topics Concern     Service No    Blood Transfusions No    Caffeine Concern Yes     Comment: 1-2 cups of coffee daily    Exercise Yes     Comment: couple times a week; nothing consistent     History   Drug Use No     Available pre-op labs reviewed.               Airway      Mallampati: II  Mouth opening: >3 FB  TM distance: 4 - 6 cm  Neck ROM: full Cardiovascular      Rhythm: regular  Rate: normal     Dental    Dentition appears grossly intact         Pulmonary    Pulmonary exam normal.  Breath sounds clear to auscultation bilaterally.               Other findings              ASA: 2   Plan: MAC  NPO status verified and     Post-procedure pain management plan discussed with surgeon and patient.    Comment: Plan is MAC anesthesia, which likely will include deep sedation.  Implied that memory of procedure is unlikely although intraop recall, if it occurs, may be a reasonable and comfortable experience with this anesthetic.  Aware that general anesthesia is not intended though deep sedation may include brief moments of general anesthesia.   Questions answered. Accepts. The consent was signed without further questions.   Plan/risks discussed with: patient  Use of blood product(s) discussed with: patient    Consented to blood products.          Present on Admission:  **None**

## 2024-05-06 NOTE — DISCHARGE INSTRUCTIONS
ENDOSCOPY DISCHARGE INSTRUCTIONS    Procedure Performed:   Gastroscopy    Endoscopist: No name on file  FINDINGS:   Salas's esophagus (change in the esophagus lining from acid damage) and inflammation of small intestine    MEDICATIONS:  You may resume all other medications today    DIET:  Resume Normal Diet    BIOPSIES:  Biopsy results will be released to you as soon as they are available as is now the law. This will not allow your physician the opportunity to go over these before they are released to you. It is not necessary to contact the office for explanation of these results. Your physician will contact you within a few business days of their release to review the findings with you    X-RAYS/LABS:   No X-rays/Labs were ordered today    ADDITIONAL RECOMMENDATIONS:    await pathology, continue pantoprazole    Activity for remainder of today:    REST TODAY  DO NOT drive or operate heavy machinery  DO NOT drink any alcoholic beverages  DO NOT sign any legal documents or make any important decisions    After your procedure(s):  It is not unusual to feel bloated or gassy .  Passing gas and belching is encouraged. Lying on your left side with your knees flexed may relieve the discomfort. A hot pack to the abdomen may also help.    After your gastroscopy (upper endoscopy): You may experience a slight sore throat which will subside. Throat lozenges or salt water gargle can be used.    FOLLOW-UP:  Contact the office at [phone] for follow-up appointment is needed or if you develop any of the following:    Severe abdominal pain/discomfort     Excessive bleeding                     Black tarry stool    Difficulty breathing/swallowing      Persistent nausea/vomiting  Fever above 100 degrees or chills

## 2024-05-06 NOTE — H&P
[facility]   part of [facility]    History & Physical    Sameer Almodovar Patient Status:  Hospital Outpatient Surgery    1975 MRN [id number]   Location [facility] ENDOSCOPY PAIN CENTER Attending Lavell Kat MD   Hosp Day # 0 PCP Flavio Chowdhury MD     Date:  2024  Date of Admission:  2024    History provided by:patient  Chief Complaint:   ohara's esophagus, celiac disease      HPI:   Sameer Almodovar is a(n) 48 year old male. Here for ohara's esophagus, celiac disease    History     Past Medical History:    Asthma (HCC)    Blood type A-    Celiac disease (HCC)    GERD (gastroesophageal reflux disease)    Visual impairment     Past Surgical History:   Procedure Laterality Date    Cholecystectomy  2017    Lasik Bilateral 1998    Upper gi endoscopy,exam       Family History   Problem Relation Age of Onset    Lipids Father     Heart Disorder Paternal Grandfather     Cancer Paternal Grandmother         colon    Cancer Maternal Grandmother         colon     Social History:  Social History     Socioeconomic History    Marital status:     Number of children: 4   Occupational History    Occupation:    Tobacco Use    Smoking status: Never    Smokeless tobacco: Never   Vaping Use    Vaping status: Never Used   Substance and Sexual Activity    Alcohol use: Not Currently     Alcohol/week: 0.0 - 1.0 standard drinks of alcohol     Comment: social-rare    Drug use: No    Sexual activity: Yes     Partners: Female   Other Topics Concern     Service No    Blood Transfusions No    Caffeine Concern Yes     Comment: 1-2 cups of coffee daily    Exercise Yes     Comment: couple times a week; nothing consistent     Social Determinants of Health     Food Insecurity: No Food Insecurity (10/19/2023)    Received from [facility]    Food Insecurity     Currently or in the past 3 months, have you worried your food would run out before you had money to buy more?:  No     In the past 12 months, have you run out of food or been unable to get more?: No   Transportation Needs: No Transportation Needs (10/19/2023)    Received from [facility]    Transportation Needs     Medical Transportation Needs?: No    Received from [facility]    Housing Stability     Allergies/Medications:   Allergies:   Allergies   Allergen Reactions    Seafood ANAPHYLAXIS, SWELLING and SHORTNESS OF BREATH     Fish and other seafood.      Medications Prior to Admission   Medication Sig    IRON OR daily.    Multiple Vitamins-Minerals (MULTI FOR HIM) Oral Tab Take by mouth.    Pantoprazole Sodium 40 MG Oral Tab EC Take 1 tablet (40 mg total) by mouth 2 (two) times daily before meals. (Patient taking differently: Take 1 tablet (40 mg total) by mouth as needed.)       Review of Systems:   Pertinent items are noted in HPI.  A comprehensive review of systems was negative.    Physical Exam:   Vital Signs:  Height 5' 7\" (1.702 m), weight 176 lb (79.8 kg).     General appearance:  alert, appears stated age, and cooperative  Head: Normocephalic, without obvious abnormality, atraumatic  Pulmonary: clear to auscultation bilaterally  Cardiovascular: S1, S2 normal, no murmur, click, rub or gallop, regular rate and rhythm  Abdominal: soft, non-tender; bowel sounds normal; no masses,  no organomegaly  Extremities: extremities normal, atraumatic, no cyanosis or edema        Results:     Lab Results   Component Value Date    WBC 8.6 04/27/2022    HGB 16.4 04/27/2022    HCT 50.2 04/27/2022    .0 04/27/2022    CREATSERUM 0.82 08/07/2021    BUN 12.0 08/07/2021     08/07/2021    K 5.14 (H) 08/07/2021     08/07/2021    CO2 22.9 08/07/2021     08/07/2021    CA 9.6 08/07/2021    ALB 4.4 08/07/2021    ALKPHO 48 08/07/2021    BILT 0.34 08/07/2021    TP 7.6 08/07/2021    AST 19 08/07/2021    ALT 19 08/07/2021    PTT 27.4 02/18/2017    INR 1.1 02/18/2017    T4F 1.0  11/20/2015    TSH 0.850 08/07/2021     04/09/2021    PSA 0.38 05/11/2018    DDIMER <0.27 04/09/2021    MG 2.10 03/20/2021    TROP <0.045 04/09/2021    B12 494 08/27/2021       No results found.        Assessment/Plan:   egd      Lavell Kat MD  5/6/2024

## 2025-05-15 ENCOUNTER — HOSPITAL ENCOUNTER (OUTPATIENT)
Dept: CT IMAGING | Facility: HOSPITAL | Age: 50
Discharge: HOME | End: 2025-05-15
Attending: UROLOGY
Payer: MEDICAID

## 2025-05-15 DIAGNOSIS — R31.29 MICROHEMATURIA: ICD-10-CM

## 2025-05-15 DIAGNOSIS — R35.1 BENIGN PROSTATIC HYPERPLASIA WITH NOCTURIA: ICD-10-CM

## 2025-05-15 DIAGNOSIS — N13.8 BPH WITH OBSTRUCTION/LOWER URINARY TRACT SYMPTOMS: ICD-10-CM

## 2025-05-15 DIAGNOSIS — N40.1 BENIGN PROSTATIC HYPERPLASIA WITH NOCTURIA: ICD-10-CM

## 2025-05-15 DIAGNOSIS — R35.0 URINARY FREQUENCY: ICD-10-CM

## 2025-05-15 DIAGNOSIS — N40.1 BPH WITH OBSTRUCTION/LOWER URINARY TRACT SYMPTOMS: ICD-10-CM

## 2025-05-15 LAB
CREAT BLD-MCNC: 1.1 MG/DL (ref 0.7–1.3)
EGFRCR SERPLBLD CKD-EPI 2021: 82 ML/MIN/1.73M2 (ref 60–?)

## 2025-05-15 PROCEDURE — 82565 ASSAY OF CREATININE: CPT

## 2025-05-15 PROCEDURE — 74178 CT ABD&PLV WO CNTR FLWD CNTR: CPT | Performed by: UROLOGY

## 2025-05-30 ENCOUNTER — PATIENT MESSAGE (OUTPATIENT)
Dept: SURGERY | Facility: HOSPITAL | Age: 50
End: 2025-05-30

## (undated) DEVICE — CLIP APPLIER, HEM-O-LOK ML: Brand: SINGLE-SITE; DAVINCI SI

## (undated) DEVICE — DRAPE SHEET LG

## (undated) DEVICE — DV KIT ACCESSORY 3-ARM DISP

## (undated) DEVICE — KIT VLV 5 PC AIR H2O SUCT BX ENDOGATOR CONN

## (undated) DEVICE — CURVED SCISSORS: Brand: SINGLE-SITE; DAVINCI SI

## (undated) DEVICE — SUTURE PDS II 0 CT-1

## (undated) DEVICE — BIPOLAR FORCEPS CORD,BANANA LEADS: Brand: VALLEYLAB

## (undated) DEVICE — STERILE LATEX POWDER-FREE SURGICAL GLOVESWITH NITRILE COATING: Brand: PROTEXIS

## (undated) DEVICE — 3M™ RED DOT™ MONITORING ELECTRODE WITH FOAM TAPE AND STICKY GEL, 50/BAG, 20/CASE, 72/PLT 2570: Brand: RED DOT™

## (undated) DEVICE — X-STREAM LAPAROSCOPIC IRRIGATION TUBING SET WITH NEZHAT-DORSEY TRUMPET VALVE, AND COJOINED SUCTION/IRRIGATION TUBING, WITHOUT PROBE TIP: Brand: X-STREAM

## (undated) DEVICE — [HIGH FLOW INSUFFLATOR,  DO NOT USE IF PACKAGE IS DAMAGED,  KEEP DRY,  KEEP AWAY FROM SUNLIGHT,  PROTECT FROM HEAT AND RADIOACTIVE SOURCES.]: Brand: PNEUMOSURE

## (undated) DEVICE — VISUALIZATION SYSTEM: Brand: CLEARIFY

## (undated) DEVICE — DV SEAL CANNULA 8.5-13MM

## (undated) DEVICE — MARYLAND DISSECTOR: Brand: SINGLE-SITE; DAVINCI SI

## (undated) DEVICE — SUCTION IRRIGATOR: Brand: SINGLE-SITE; DAVINCI SI

## (undated) DEVICE — SUTURE VICRYL 0 UR-6

## (undated) DEVICE — UNDYED BRAIDED (POLYGLACTIN 910), SYNTHETIC ABSORBABLE SUTURE: Brand: COATED VICRYL

## (undated) DEVICE — COVER STND 54X23IN MAYO REINF

## (undated) DEVICE — BITEBLOCK ENDOSCP 60FR MAXI STRP

## (undated) DEVICE — SOL  .9 1000ML BTL

## (undated) DEVICE — SUCTION CANISTER, 3000CC,SAFELINER: Brand: DEROYAL

## (undated) DEVICE — APPLICATOR COTTON TIP 6\" 2/PK

## (undated) DEVICE — LAP CHOLE: Brand: MEDLINE INDUSTRIES, INC.

## (undated) DEVICE — KIT CUSTOM ENDOPROCEDURE STERIS

## (undated) DEVICE — CLIP HEMOLOK MEDIUM GREEN

## (undated) DEVICE — Device

## (undated) DEVICE — PERMANENT CAUTERY HOOK: Brand: ENDOWRIST;DAVINCI SI

## (undated) DEVICE — GIJAW SINGLE-USE BIOPSY FORCEPS WITH NEEDLE: Brand: GIJAW

## (undated) DEVICE — SUTURE VICRYL 3-0 SH

## (undated) DEVICE — CROCODILE GRASPER: Brand: SINGLE-SITE; DAVINCI SI

## (undated) DEVICE — TROCAR: Brand: KII FIOS FIRST ENTRY

## (undated) DEVICE — 1200CC GUARDIAN II: Brand: GUARDIAN

## (undated) DEVICE — SOL  .9 3000ML

## (undated) DEVICE — TROCAR: Brand: KII SHIELDED BLADED ACCESS SYSTEM

## (undated) DEVICE — DRAPE UNDER BUTTOCKS

## (undated) DEVICE — ENDOSCOPE PORT: Brand: SINGLE-SITE

## (undated) DEVICE — PERMANENT CAUTERY HOOK: Brand: SINGLE-SITE; DAVINCI SI

## (undated) DEVICE — GOWN SURG AERO BLUE PERF LG

## (undated) DEVICE — 10FT COMBINED O2 DELIVERY/CO2 MONITORING. FILTER WITH MICROSTREAM TYPE LUER: Brand: DUAL ADULT NASAL CANNULA

## (undated) DEVICE — CATH CHLGM 18IN 4.5FR PP

## (undated) NOTE — IP AVS SNAPSHOT
[facility]    [address], [place], [place] ~ [phone]                Discharge Summary   2/22/2017    Carmen Ovalle           Admission Information        Provider Department    2/22/2017 Annie Degroot MD [facility] · If you had a nerve block for your surgery, take extra care not to put any pressure on your arm or hand for 24 hours    It is normal:  · For you to have a sore throat if you had a breathing tube during surgery (while you were asleep!).  The sore throat livia stamped envelope. The questionnaire should take no longer than a few minutes to complete and your answers are private. Your health and feedback are important to us!     If you receive a NSQIP questionnaire, and have questions please contact:   Mary Alice Durham All incisions become somewhat hard and sometimes lumpy. That is part of the normal healing process. Bruising around the incisions or lower is also normal and should resolve with time. Low grade fever up to 101F is normal after surgery.  Severe swelling, 9.4 (02/18/17)  46 (02/18/17)  30      Metabolic Lab Results  (Last result in the past 90 days)    ALT Bilirubin,Total Total Protein Albumin Sodium Potassium Chloride    (02/18/17)  47 (02/18/17)  0.9 (02/18/17)  7.5 (02/18/17)  4.2 (02/18/17)  140 (02/18/

## (undated) NOTE — LETTER
April 7, 2017    [address] [place] [address]      Dear Elsie Matthew:      Results reviewed. CT of abdomen shows no significant abnormalities. Nothing to explain blood in the urine.          Dr Elizabeth Herb